# Patient Record
Sex: MALE | Race: WHITE
[De-identification: names, ages, dates, MRNs, and addresses within clinical notes are randomized per-mention and may not be internally consistent; named-entity substitution may affect disease eponyms.]

---

## 2018-03-23 ENCOUNTER — HOSPITAL ENCOUNTER (EMERGENCY)
Dept: HOSPITAL 41 - JD.ED | Age: 43
Discharge: HOME | End: 2018-03-23
Payer: COMMERCIAL

## 2018-03-23 VITALS — SYSTOLIC BLOOD PRESSURE: 140 MMHG | DIASTOLIC BLOOD PRESSURE: 91 MMHG

## 2018-03-23 DIAGNOSIS — J10.1: Primary | ICD-10-CM

## 2018-03-23 NOTE — EDM.PDOC
ED HPI GENERAL MEDICAL PROBLEM





- General


Chief Complaint: Respiratory Problem


Stated Complaint: COUGH SORE THROAT


Time Seen by Provider: 03/23/18 20:27


Source of Information: Reports: Patient


History Limitations: Reports: Language Barrier





- History of Present Illness


INITIAL COMMENTS - FREE TEXT/NARRATIVE: 





42-year-old male presents for evaluation and treatment of cough and sore 

throat. Reports his symptoms started on Tuesday. He states that he has a fever 

of 101 to 102.1. Started  vomiting this afternoon. Has vomited twice thus far. 

Reports chest pain associated with the cough. He is also having a sore throat. 

No ear pain or abdominal pain.





No influenza vaccine this season.





Has missed several days of work due to his illness.





- Related Data


 Allergies











Allergy/AdvReac Type Severity Reaction Status Date / Time


 


No Known Allergies Allergy   Verified 03/23/18 20:10











Home Meds: 


 Home Meds





Oseltamivir [Tamiflu] 75 mg PO BID #10 cap 03/23/18 [Rx]











Past Medical History





- Past Health History


Medical/Surgical History: Denies Medical/Surgical History





Social & Family History





- Tobacco Use


Smoking Status *Q: Never Smoker


Second Hand Smoke Exposure: No





- Caffeine Use


Caffeine Use: Reports: None





- Alcohol Use


Days Per Week of Alcohol Use: 0





- Recreational Drug Use


Recreational Drug Use: No





- Living Situation & Occupation


Living situation: Reports: 


Occupation: Employed





ED ROS GENERAL





- Review of Systems


Review Of Systems: See Below


Constitutional: Reports: Fever, Chills


HEENT: Reports: Throat Pain.  Denies: Ear Pain


Respiratory: Reports: Cough


Cardiovascular: Reports: Chest Pain (due to cough)


GI/Abdominal: Reports: Vomiting.  Denies: Abdominal Pain


Neurological: Reports: Headache





ED EXAM, GENERAL





- Physical Exam


Exam: See Below


Exam Limited By: No Limitations


General Appearance: Alert, WD/WN, No Apparent Distress


Ears: Normal External Exam, Normal Canal, Hearing Grossly Normal, Normal TMs


Nose: Normal Inspection


Throat/Mouth: Normal Inspection, Normal Lips, Normal Voice, No Airway Compromise


Respiratory/Chest: No Respiratory Distress, Lungs Clear, Normal Breath Sounds


Cardiovascular: Normal Peripheral Pulses, Regular Rate, Rhythm, No Murmur


Neurological: Alert, Oriented, Normal Cognition


Psychiatric: Normal Affect, Normal Mood


Skin Exam: Warm, Dry, Normal Color





Course





- Vital Signs


Last Recorded V/S: 





 Last Vital Signs











Temp  36.2 C   03/23/18 20:08


 


Pulse  95   03/23/18 20:08


 


Resp  20   03/23/18 20:08


 


BP  140/91 H  03/23/18 20:08


 


Pulse Ox  98   03/23/18 20:08














- Re-Assessments/Exams


Free Text/Narrative Re-Assessment/Exam: 





03/23/18 21:00


Influenza returned positive for type A.





Discussed the risk and benefits of Tamiflu. He would like to start the Tamiflu.





Discharge instructions as documented.





Departure





- Departure


Time of Disposition: 21:07


Disposition: Home, Self-Care 01


Condition: Fair


Clinical Impression: 


 Influenza








- Discharge Information


Prescriptions: 


Oseltamivir [Tamiflu] 75 mg PO BID #10 cap


Referrals: 


PCP,None [Primary Care Provider] - 


Forms:  ED Department Discharge, ED Return to Work/School Form


Additional Instructions: 


Take the Tamiflu as prescribed. 1 cap Twice a day for 5 days. Tamiflu is known 

to cause upset stomach, nausea and diarrhea.





You are contagious one day before your symptoms started and up to a week 

afterwards. 





Make sure you wash your hands and cover your cough.





Rest.





Make sure you're drinking plenty of fluids.





Over-the-counter Tylenol and Motrin as needed for headaches and additional pain 

and symptom relief.





Follow-up with family medicine if not much better in 1-2 weeks.





Please return to the ER for symptoms change or worsen.

## 2019-02-14 ENCOUNTER — HOSPITAL ENCOUNTER (OUTPATIENT)
Dept: HOSPITAL 41 - JD.ED | Age: 44
Discharge: HOME | End: 2019-02-14
Attending: SURGERY
Payer: COMMERCIAL

## 2019-02-14 VITALS — DIASTOLIC BLOOD PRESSURE: 74 MMHG | SYSTOLIC BLOOD PRESSURE: 127 MMHG

## 2019-02-14 DIAGNOSIS — R07.2: Primary | ICD-10-CM

## 2019-02-14 PROCEDURE — 96365 THER/PROPH/DIAG IV INF INIT: CPT

## 2019-02-14 PROCEDURE — 96361 HYDRATE IV INFUSION ADD-ON: CPT

## 2019-02-14 PROCEDURE — 71260 CT THORAX DX C+: CPT

## 2019-02-14 PROCEDURE — 36415 COLL VENOUS BLD VENIPUNCTURE: CPT

## 2019-02-14 PROCEDURE — 43235 EGD DIAGNOSTIC BRUSH WASH: CPT

## 2019-02-14 PROCEDURE — 80053 COMPREHEN METABOLIC PANEL: CPT

## 2019-02-14 PROCEDURE — 83690 ASSAY OF LIPASE: CPT

## 2019-02-14 PROCEDURE — 99284 EMERGENCY DEPT VISIT MOD MDM: CPT

## 2019-02-14 PROCEDURE — 85025 COMPLETE CBC W/AUTO DIFF WBC: CPT

## 2019-02-14 RX ADMIN — Medication PRN ML: at 19:15

## 2019-02-14 RX ADMIN — Medication PRN ML: at 19:21

## 2019-02-14 NOTE — PCM.PREANE
Preanesthetic Assessment





- Anesthesia/Transfusion/Family Hx


Anesthesia History: No Prior Anesthesia


Family History of Anesthesia Reaction: No





- Review of Systems


General: No Symptoms


Pulmonary: No Symptoms


Cardiovascular: No Symptoms


Gastrointestinal: No Symptoms (Hurts to swallow. )


Neurological: No Symptoms


Other: Reports: None (Gout)





- Physical Assessment


NPO Status Date: 02/14/19


NPO Status Time: 17:30 (Water sip. Solids at 1300. )


Pulse: 74


O2 Sat by Pulse Oximetry: 99


Respiratory Rate: 18


Blood Pressure: 155/100


Temperature: 36.6 C


Vital Signs: 





 Last Vital Signs











Temp  36.9 C   02/14/19 21:11


 


Pulse  75   02/14/19 21:11


 


Resp  19   02/14/19 21:11


 


BP  113/57 L  02/14/19 21:11


 


Pulse Ox  96   02/14/19 21:11











Height: 1.78 m


Weight: 90.718 kg


ASA Class: 1E


Mental Status: Alert & Oriented x3


Airway Class: Mallampati = 1


Dentition: Reports: Normal Dentition


Thyro-Mental Finger Breadths: 3


Mouth Opening Finger Breadths: 3


ROM/Head Extension: Full


Lungs: Clear to Auscultation, Normal Respiratory Effort


Cardiovascular: Regular Rate, Regular Rhythm





- Lab


Values: 





 Laboratory Last Values











WBC  10.05 K/mm3 (4.23-9.07)  H  02/14/19  19:00    


 


RBC  5.17 M/mm3 (4.63-6.08)   02/14/19  19:00    


 


Hgb  15.1 gm/L (13.7-17.5)   02/14/19  19:00    


 


Hct  43.7 % (40.1-51.0)   02/14/19  19:00    


 


MCV  84.5 fl (79.0-92.2)   02/14/19  19:00    


 


MCH  29.2 pg (25.7-32.2)   02/14/19  19:00    


 


MCHC  34.6 g/dl (32.2-35.5)   02/14/19  19:00    


 


RDW Std Deviation  41.3 fL (35.1-43.9)   02/14/19  19:00    


 


Plt Count  297 K/mm3 (163-337)   02/14/19  19:00    


 


MPV  9.8 fl (9.4-12.3)   02/14/19  19:00    


 


Neut % (Auto)  44.8 % (34.0-67.9)   02/14/19  19:00    


 


Lymph % (Auto)  45.6 % (21.8-53.1)   02/14/19  19:00    


 


Mono % (Auto)  7.1 % (5.3-12.2)   02/14/19  19:00    


 


Eos % (Auto)  2.1  (0.8-7.0)   02/14/19  19:00    


 


Baso % (Auto)  0.2 % (0.1-1.2)   02/14/19  19:00    


 


Neut # (Auto)  4.51 K/mm3 (1.78-5.38)   02/14/19  19:00    


 


Lymph # (Auto)  4.58 K/mm3 (1.32-3.57)  H  02/14/19  19:00    


 


Mono # (Auto)  0.71 K/mm3 (0.30-0.82)   02/14/19  19:00    


 


Eos # (Auto)  0.21 K/mm3 (0.04-0.54)   02/14/19  19:00    


 


Baso # (Auto)  0.02 K/mm3 (0.01-0.08)   02/14/19  19:00    


 


Sodium  143 mEq/L (136-145)   02/14/19  19:00    


 


Potassium  3.9 mEq/L (3.5-5.1)   02/14/19  19:00    


 


Chloride  105 mEq/L ()   02/14/19  19:00    


 


Carbon Dioxide  27 mEq/L (21-32)   02/14/19  19:00    


 


Anion Gap  14.9  (5-15)   02/14/19  19:00    


 


BUN  12 mg/dL (7-18)   02/14/19  19:00    


 


Creatinine  1.1 mg/dL (0.7-1.3)   02/14/19  19:00    


 


Est Cr Clr Drug Dosing  89.41 mL/min  02/14/19  19:00    


 


Estimated GFR (MDRD)  > 60 mL/min (>60)   02/14/19  19:00    


 


BUN/Creatinine Ratio  10.9  (14-18)  L  02/14/19  19:00    


 


Glucose  115 mg/dL ()  H  02/14/19  19:00    


 


Calcium  9.4 mg/dL (8.5-10.1)   02/14/19  19:00    


 


Total Bilirubin  0.5 mg/dL (0.2-1.0)   02/14/19  19:00    


 


AST  30 U/L (15-37)   02/14/19  19:00    


 


ALT  91 U/L (16-63)  H  02/14/19  19:00    


 


Alkaline Phosphatase  171 U/L ()  H  02/14/19  19:00    


 


Total Protein  8.3 g/dl (6.4-8.2)  H  02/14/19  19:00    


 


Albumin  4.6 g/dl (3.4-5.0)   02/14/19  19:00    


 


Globulin  3.7 gm/dL  02/14/19  19:00    


 


Albumin/Globulin Ratio  1.2  (1-2)   02/14/19  19:00    


 


Lipase  93 U/L ()   02/14/19  19:00    














- Allergies


Allergies/Adverse Reactions: 


 Allergies











Allergy/AdvReac Type Severity Reaction Status Date / Time


 


No Known Allergies Allergy   Verified 02/14/19 18:31














- Acknowledgements


Anesthesia Type Planned: MAC


Pt an Appropriate Candidate for the Planned Anesthesia: Yes


Alternatives and Risks of Anesthesia Discussed w Pt/Guardian: Yes


Pt/Guardian Understands and Agrees with Anesthesia Plan: Yes





PreAnesthesia Questionnaire





- Past Health History


Medical/Surgical History: Denies Medical/Surgical History





- SUBSTANCE USE


Smoking Status *Q: Never Smoker


Recreational Drug Use History: No





- HOME MEDS


Home Medications: 


 Home Meds





. [No Known Home Meds]  02/14/19 [History]











- CURRENT (IN HOUSE) MEDS


Current Meds: 





 Current Medications





Diphenhydramine HCl (Benadryl)  25 mg IVPUSH Q6H PRN


   PRN Reason: Pruritis


Fentanyl (Sublimaze)  50 mcg IVPUSH Q5M PRN


   PRN Reason: Pain


Sodium Chloride (Normal Saline)  1,000 mls @ 150 mls/hr IV ASDIRECTED JOE


   Last Admin: 02/14/19 19:21 Dose:  150 mls/hr


Ondansetron HCl (Zofran)  4 mg IVPUSH ONETIME PRN


   PRN Reason: Nausea/Vomiting


Sodium Chloride (Saline Flush)  10 ml FLUSH ASDIRECTED PRN


   PRN Reason: Keep Vein Open


   Last Admin: 02/14/19 19:21 Dose:  10 ml





Discontinued Medications





Dexamethasone (Dexamethasone) Confirm Administered Dose 12 mg .ROUTE .STK-MED 

ONE


   Stop: 02/14/19 20:54


Fentanyl (Sublimaze) Confirm Administered Dose 250 mcg .ROUTE .STK-MED ONE


   Stop: 02/14/19 20:12


Piperacillin Sod/Tazobactam (Sod 4.5 gm/ Sodium Chloride)  100 mls @ 200 mls/hr 

IV ONETIME ONE


   Stop: 02/14/19 20:22


   Last Admin: 02/14/19 20:02 Dose:  200 mls/hr


Sodium Chloride (Normal Saline) Confirm Administered Dose 100 mls @ as directed 

.ROUTE .STK-MED ONE


   Stop: 02/14/19 19:57


   Last Admin: 02/14/19 20:03 Dose:  Not Given


Lidocaine HCl (Xylocaine-Mpf 1%) Confirm Administered Dose 4 mls @ as directed 

.ROUTE .STK-MED ONE


   Stop: 02/14/19 20:12


Lactated Ringer's (Ringers, Lactated) Confirm Administered Dose 1,000 mls @ as 

directed .ROUTE .STK-MED ONE


   Stop: 02/14/19 20:12


Iopamidol (Isovue-300 (61%))  100 ml IVPUSH ONETIME ONE


   Stop: 02/14/19 18:42


   Last Admin: 02/14/19 19:15 Dose:  80 ml


Midazolam HCl (Versed 1 Mg/Ml) Confirm Administered Dose 2 mg .ROUTE .STK-MED 

ONE


   Stop: 02/14/19 20:12


Ondansetron HCl (Zofran) Confirm Administered Dose 4 mg .ROUTE .STK-MED ONE


   Stop: 02/14/19 20:12


Propofol (Diprivan  20 Ml) Confirm Administered Dose 400 mg .ROUTE .STK-MED ONE


   Stop: 02/14/19 20:12


Succinylcholine Chloride (Succinylcholine In Ns Pf) Confirm Administered Dose 

100 mg .ROUTE .STK-MED ONE


   Stop: 02/14/19 20:12

## 2019-02-14 NOTE — CT
CT chest

 

Technique: Multiple axial sections were obtained from above the lung 

apices inferiorly through the lung bases.  Intravenous contrast was 

utilized.

 

Findings: Small portion of the visualized upper abdominal structures 

appear within normal limits.  No radiopaque foreign object is 

appreciated within the esophagus.  No abnormal densities or fluid is 

seen around the esophagus.  No evidence of mediastinal abscess.  Heart

 size is normal.  Aorta and pulmonary arteries appear within normal 

limits.  No pericardial effusion or thickening is seen.

 

Lung window settings were reviewed which shows minimal atelectasis or 

scarring within the right middle lobe.  Small nodule is noted within 

the inferior right upper lung in a subpleural location measuring 2.3 

mm.  Lungs otherwise are clear.

 

Bone window settings were obtained which shows minimal degenerative 

change within the lower thoracic spine.  No acute bony abnormality is 

appreciated.

 

Impression:

1.  2.3 mm nodule within a subpleural location within the right lung 

base.  If patient is a smoker, recommend follow-up noncontrast chest 

CT in one year.  If patient is not a smoker, this can be ignored.

2.  Other incidental findings.

3.  No additional abnormality is identified on contrast-enhanced chest

 CT.  

 

Diagnostic code #3

## 2019-02-14 NOTE — PCM.POSTAN
POST ANESTHESIA ASSESSMENT





- MENTAL STATUS


Mental Status: Other (Drowsy)





- VITAL SIGNS


Pulse Rate: 75


SaO2: 96


Resp Rate: 19


Blood Pressure: 113/57


Temperature: 36.9 C





- RESPIRATORY


Respiratory Status: Respiratory Rate WNL, Airway Patent, O2 Saturation Stable, 

Supplemental Oxygen





- CARDIOVASCULAR


CV Status: Pulse Rate WNL, Blood Pressure Stable





- GASTROINTESTINAL


GI Status: No Symptoms





- PAIN


Pain Score: 0





- POST OP HYDRATION


Hydration Status: Adequate & Stable

## 2019-02-14 NOTE — EDM.PDOC
<JadenmasonRonaldo SORIANO - Last Filed: 02/14/19 18:46>





ED HPI GENERAL MEDICAL PROBLEM





- General


Chief Complaint: Gastrointestinal Problem


Stated Complaint: PAIN WHEN HE EATS


Time Seen by Provider: 02/14/19 18:33


Source of Information: Reports: Patient, Family


History Limitations: Reports: Language Barrier





- History of Present Illness


INITIAL COMMENTS - FREE TEXT/NARRATIVE: 





The patient was sent from the walk in clinic because he swallowed a tooth pick.

  He says this happened on Tuesday.  It feels like it is stuck in his esophagus 

in his chest.  He says he can eat and drink but certain foods kind of get 

stuck.  The patient speaks [place] and his wife is here helping him 

communicate.  He has no fever, chills, cough, shortness of breath, abdominal 

pain, nausea or vomiting.  He has no medical problems.


Onset: Sudden


Duration: Day(s): (2)


Location: Reports: Chest


Quality: Reports: Other (Discomfort when swallowing)


Severity: Mild


Improves with: Reports: None


Worsens with: Reports: None


Associated Symptoms: Reports: Chest Pain.  Denies: Cough, Fever/Chills, 

Headaches, Nausea/Vomiting, Shortness of Breath


  ** upper mid chest


Pain Score (Numeric/FACES): 3





- Related Data


 Allergies











Allergy/AdvReac Type Severity Reaction Status Date / Time


 


No Known Allergies Allergy   Verified 02/14/19 18:31











Home Meds: 


 Home Meds





. [No Known Home Meds]  02/14/19 [History]











Past Medical History





- Past Health History


Medical/Surgical History: Denies Medical/Surgical History





Social & Family History





- Family History


Family Medical History: Noncontributory





- Tobacco Use


Smoking Status *Q: Never Smoker





- Caffeine Use


Caffeine Use: Reports: Coffee





- Recreational Drug Use


Recreational Drug Use: No





- Living Situation & Occupation


Living situation: Reports: 


Occupation: Employed





ED ROS GENERAL





- Review of Systems


Review Of Systems: See Below


Constitutional: Reports: No Symptoms


HEENT: Reports: No Symptoms


Respiratory: Reports: No Symptoms


Cardiovascular: Reports: Chest Pain (Discomfort with eating)


Endocrine: Reports: No Symptoms


GI/Abdominal: Reports: No Symptoms


: Reports: No Symptoms


Musculoskeletal: Reports: No Symptoms





ED EXAM, GI/ABD





- Physical Exam


Exam: See Below


Exam Limited By: No Limitations


General Appearance: Alert, No Apparent Distress


Ears: Normal External Exam


Nose: Normal Inspection


Head: Atraumatic, Normocephalic


Neck: Normal Inspection


Respiratory/Chest: No Respiratory Distress, Lungs Clear, Normal Breath Sounds


Cardiovascular: Regular Rate, Rhythm, No Edema, No Murmur


GI/Abdominal Exam: Soft, Non-Tender, No Organomegaly, No Mass


Back Exam: Normal Inspection


Extremities: Normal Inspection





Course





- Vital Signs


Last Recorded V/S: 


 Last Vital Signs











Temp  36.6 C   02/14/19 18:25


 


Pulse  74   02/14/19 18:25


 


Resp  18   02/14/19 18:25


 


BP  155/100 H  02/14/19 18:25


 


Pulse Ox  99   02/14/19 18:25














- Orders/Labs/Meds


Orders: 


 Active Orders 24 hr











 Category Date Time Status


 


 Cardiac Monitoring [RC] .AS DIRECTED Care  02/14/19 18:33 Active


 


 Peripheral IV Care [RC] .AS DIRECTED Care  02/14/19 18:34 Active


 


 COMPREHENSIVE METABOLIC PN,CMP [CHEM] Stat Lab  02/14/19 19:00 Received


 


 LIPASE [CHEM] Stat Lab  02/14/19 19:00 Received


 


 Sodium Chloride 0.9% [Normal Saline] 1,000 ml Med  02/14/19 19:15 Active





 IV ASDIRECTED   


 


 Sodium Chloride 0.9% [Saline Flush] Med  02/14/19 18:33 Active





 10 ml FLUSH ASDIRECTED PRN   


 


 Peripheral IV Insertion Adult [OM.PC] Stat Oth  02/14/19 18:33 Ordered








 Medication Orders





Sodium Chloride (Normal Saline)  1,000 mls @ 150 mls/hr IV ASDIRECTED JOE


   Last Admin: 02/14/19 19:21  Dose: 150 mls/hr


Sodium Chloride (Saline Flush)  10 ml FLUSH ASDIRECTED PRN


   PRN Reason: Keep Vein Open


   Last Admin: 02/14/19 19:21  Dose: 10 ml


   Admin: 02/14/19 19:15  Dose: 10 ml








Labs: 


 Laboratory Tests











  02/14/19 Range/Units





  19:00 


 


WBC  10.05 H  (4.23-9.07)  K/mm3


 


RBC  5.17  (4.63-6.08)  M/mm3


 


Hgb  15.1  (13.7-17.5)  gm/L


 


Hct  43.7  (40.1-51.0)  %


 


MCV  84.5  (79.0-92.2)  fl


 


MCH  29.2  (25.7-32.2)  pg


 


MCHC  34.6  (32.2-35.5)  g/dl


 


RDW Std Deviation  41.3  (35.1-43.9)  fL


 


Plt Count  297  (163-337)  K/mm3


 


MPV  9.8  (9.4-12.3)  fl


 


Neut % (Auto)  44.8  (34.0-67.9)  %


 


Lymph % (Auto)  45.6  (21.8-53.1)  %


 


Mono % (Auto)  7.1  (5.3-12.2)  %


 


Eos % (Auto)  2.1  (0.8-7.0)  


 


Baso % (Auto)  0.2  (0.1-1.2)  %


 


Neut # (Auto)  4.51  (1.78-5.38)  K/mm3


 


Lymph # (Auto)  4.58 H  (1.32-3.57)  K/mm3


 


Mono # (Auto)  0.71  (0.30-0.82)  K/mm3


 


Eos # (Auto)  0.21  (0.04-0.54)  K/mm3


 


Baso # (Auto)  0.02  (0.01-0.08)  K/mm3











Meds: 


Medications











Generic Name Dose Route Start Last Admin





  Trade Name Freq  PRN Reason Stop Dose Admin


 


Sodium Chloride  1,000 mls @ 150 mls/hr  02/14/19 19:15  02/14/19 19:21





  Normal Saline  IV   150 mls/hr





  ASDIRECTED JOE   Administration





     





     





     





     


 


Sodium Chloride  10 ml  02/14/19 18:33  02/14/19 19:21





  Saline Flush  FLUSH   10 ml





  ASDIRECTED PRN   Administration





  Keep Vein Open   





     





     





     














Discontinued Medications














Generic Name Dose Route Start Last Admin





  Trade Name Freq  PRN Reason Stop Dose Admin


 


Iopamidol  100 ml  02/14/19 18:41  02/14/19 19:15





  Isovue-300 (61%)  IVPUSH  02/14/19 18:42  80 ml





  ONETIME ONE   Administration





     





     





     





     














- Re-Assessments/Exams


Free Text/Narrative Re-Assessment/Exam: 





02/14/19 18:52


I ordered an IV saline lock, labs and a CT of his chest with contrast.  Dr Murray was in the department and she came to see him.  She requested a CT be 

done.  I have ordered that.  It is change of shift Dr Sotelo to take over.





Departure





- Departure


Disposition: DC/Tfer to [facility]


Clinical Impression: 


 Foreign body in esophagus








- Discharge Information


Referrals: 


PCP,None [Primary Care Provider] - 





- My Orders


Last 24 Hours: 


My Active Orders





02/14/19 19:15


Sodium Chloride 0.9% [Normal Saline] 1,000 ml IV ASDIRECTED 














- Assessment/Plan


Last 24 Hours: 


My Active Orders





02/14/19 19:15


Sodium Chloride 0.9% [Normal Saline] 1,000 ml IV ASDIRECTED 














<Tres Sotelo - Last Filed: 02/14/19 19:43>





Course





- Re-Assessments/Exams


Free Text/Narrative Re-Assessment/Exam: 





02/14/19 19:37


Case received from Dr. Benito, and the CT reviewed with Dr. Murray.





CT of the chest with contrast read by Dr. Sol as:


1. 2.3 mm nodule within a subpleural location within the right lung base. The 

patient is a smoker, recommend follow-up noncontrast chest CT in one year. If 

patient is a nonsmoker, this can be ignored.


2. Other incidental findings.


3. No additional abnormality is identified on noncontrast-enhanced chest CT.








02/14/19 19:42


Case discussed with Dr. Murray. She will take the patient to the OR for 

endoscopy.





Departure





- Departure


Time of Disposition: 19:42


Condition: Good





- Discharge Information


*PRESCRIPTION DRUG MONITORING PROGRAM REVIEWED*: Not Applicable


*COPY OF PRESCRIPTION DRUG MONITORING REPORT IN PATIENT SANTIAGO: Not Applicable

## 2019-02-15 NOTE — OR
DATE OF OPERATION:  02/14/2019

 

SURGEON:  Caitlyn Murray MD

 

PREOPERATIVE DIAGNOSIS:

Swallowed a toothpick, questionable.

 

POSTOPERATIVE DIAGNOSIS:

Swallowed a toothpick, questionable, with normal esophagus.

 

OPERATION PERFORMED:  Upper endoscopy to evaluate the esophagus.

 

ANESTHESIA:

IV sedation.

 

ESTIMATED BLOOD LOSS:

None.

 

BRIEF HISTORY:

Jose L Phillip is a 43-year-old male who on Tuesday swallowed half a toothpick.

He complained of midsternal pain.  He otherwise had no abdominal pain.  A CT

scan was done to see if there was any signs of perforation and this clearly was

normal.  At this point in time, we are going to scope him to see if we can see

if there is any embedded foreign body.  I had the opportunity to discuss the

risks and benefits with Mr. Phillip and his wife.  Risks included bleeding,

infection, heart attack, death, injury to structures not intended, and even that

we might not even find anything.  Having said that, they agreed to proceed.

 

DESCRIPTION OF PROCEDURE:

The patient was taken to the operating room.  Time-out was performed.  Then he

was placed on his left side down with a bite-block.  IV sedation was begun.  We

had a little bit of difficulty passing the scope because he had a pretty good

gag reflex, but after a bit of time I was able to pass it into the esophagus.

Clearly, the esophagus looked normal.  There was no ulcerations that I could

appreciate and there was no signs of inflammation.  I was able to pass the scope

all the way down into the stomach.  I then slowly came out again, the GE

junction looked normal and again when I got a good look I could not see any

gross pathology.  There was 1 area that looked like there was a little extra

mucosa, but I was able to irrigate it off and it looked normal.

 

I then went all the way back up to the epiglottis.

 

Instructions:  I talked to the wife and stated that I did not see anything at

this time.  However, the toothpick could be imbedded in the submucosal area and

I would not be able to see it, but there was nothing at this point in time to

give me the suspicion that it is there.

 

The discharge diet will be regular and he will be followed up as needed.

 

The wife seemed pleased.

 

DD:  02/14/2019 20:59:35

DT:  02/15/2019 02:40:21  MMODAL

Job #:  [id number]

## 2019-02-15 NOTE — CONS
CONSULTING PHYSICIAN:  Caitlyn Murray MD

DATE OF CONSULTATION:  02/14/2019

 

CHIEF COMPLAINT:

Swallowing of a toothpick.

 

HISTORY OF PRESENT ILLNESS:

The patient is a patient, who stated that on Tuesday he swallowed a toothpick.

He noted immediately that he had some discomfort in the middle of his chest, but

no fevers or chills.  He has had no spitting up and he has been able to do

liquids without problems.  He had some other foods chew without significant

pain, but the discomfort has progressed over the course of these several days.

 

The patient states that, because of the ongoing pain is why he sought medical

attention.  He was evaluated in the emergency department and a CT scan does not

show any signs of extraluminal air or any signs of mediastinal abscess.

 

The patient himself speaks Bulgarian, but does understand English fairly well.

His wife, however, is with as an .  He states otherwise he is

healthy.

 

PAST MEDICAL HISTORY:

None.

 

ALLERGIES:

None.

 

CURRENT MEDICATIONS:

For gout.

 

SOCIAL HISTORY:

Smoking, negative.  Alcohol, social.  He has 3 children alive and well.  Three

sisters, 4 brothers.  Mom and Dad are all in [place].  He works here in a

[employer].

 

REVIEW OF SYSTEMS:

He has otherwise been healthy.  No history of blurred or double vision.  He has

no thyroid or hepatitis.  He denies any shortness of breath or cough.  He has no

history of heart.  No history of abdominal pain or tenderness.  No blood in his

urine and no change in his bowel habits.  According to the discussion with his

wife and the patient, he has no history of bleeding problems that I could tell.

He has had a tooth pulled in the past with anesthesia without difficulty.

 

PHYSICAL EXAMINATION:

VITAL SIGNS:  GCS is 15.

NECK:  Without any subcutaneous air.

LUNGS:  Clear.

HEART:  Rhythm is regular.  He is not tachycardic.

ABDOMEN:  Totally soft, nontender.

EXTREMITIES:  Ankles are free of edema.  I have reviewed the CT scan and again

there is no sign of harley mediastinal air.

 

IMPRESSION AND PLAN:

Indeed, there is a possibility that he has a piece of toothpick in the mid

esophagus is where he points.  I clearly explained to the wife and him that this

would not be seen itself on the x-rays, but only because this does not called

radiopaque.  I explained to them that there is a chance that it could be just

imbedded in the wall and this would be something that I could see perhaps with

the upper endoscopy.  I clearly discussed the risks and benefits to include, but

not limited to bleeding, infection, perforation, and even I could probably not

even find the foreign body, I could even possibly find it and not be able to get

it out.  If I want to get it out, there it could then be that he would need to

have some additional antibiotics.  I offered to transfer him to a higher level

of care, they declined.

 

We will go ahead here and see if we can go and proceed with an upper endoscopy.

I will go ahead and give him some Unasyn prophylaxis just in case there is

something that we can entertain.  I explained to the family also the other

possibility he just have a little tear of the mucosa and that is why he has some

discomfort or even a small ulceration, but this is something that we should be

able to see with the scope.

 

Having said this, we are going to proceed.

 

DD:  02/14/2019 20:01:46

DT:  02/15/2019 01:51:37  ISABEL

Job #:  [id number]